# Patient Record
Sex: MALE | Race: WHITE
[De-identification: names, ages, dates, MRNs, and addresses within clinical notes are randomized per-mention and may not be internally consistent; named-entity substitution may affect disease eponyms.]

---

## 2020-06-01 ENCOUNTER — HOSPITAL ENCOUNTER (EMERGENCY)
Dept: HOSPITAL 26 - MED | Age: 35
Discharge: HOME | End: 2020-06-01
Payer: MEDICAID

## 2020-06-01 VITALS — BODY MASS INDEX: 34.1 KG/M2 | HEIGHT: 68 IN | WEIGHT: 225 LBS

## 2020-06-01 VITALS — DIASTOLIC BLOOD PRESSURE: 84 MMHG | SYSTOLIC BLOOD PRESSURE: 128 MMHG

## 2020-06-01 VITALS — SYSTOLIC BLOOD PRESSURE: 164 MMHG | DIASTOLIC BLOOD PRESSURE: 101 MMHG

## 2020-06-01 DIAGNOSIS — I10: ICD-10-CM

## 2020-06-01 DIAGNOSIS — R07.9: Primary | ICD-10-CM

## 2020-06-01 LAB
ALBUMIN FLD-MCNC: 4.1 G/DL (ref 3.4–5)
ANION GAP SERPL CALCULATED.3IONS-SCNC: 13.9 MMOL/L (ref 8–16)
AST SERPL-CCNC: 13 U/L (ref 15–37)
BASOPHILS # BLD AUTO: 0.1 K/UL (ref 0–0.22)
BASOPHILS NFR BLD AUTO: 0.9 % (ref 0–2)
BILIRUB SERPL-MCNC: 0.4 MG/DL (ref 0–1)
BUN SERPL-MCNC: 14 MG/DL (ref 7–18)
CHLORIDE SERPL-SCNC: 102 MMOL/L (ref 98–107)
CHOLEST/HDLC SERPL: 4.6 {RATIO} (ref 1–4.5)
CK MB SERPL-MCNC: 0.6 NG/ML (ref 0–3.6)
CO2 SERPL-SCNC: 27.6 MMOL/L (ref 21–32)
CREAT SERPL-MCNC: 1 MG/DL (ref 0.6–1.3)
EOSINOPHIL # BLD AUTO: 0.1 K/UL (ref 0–0.4)
EOSINOPHIL NFR BLD AUTO: 0.5 % (ref 0–4)
ERYTHROCYTE [DISTWIDTH] IN BLOOD BY AUTOMATED COUNT: 13.1 % (ref 11.6–13.7)
GFR SERPL CREATININE-BSD FRML MDRD: 111 ML/MIN (ref 90–?)
GLUCOSE SERPL-MCNC: 136 MG/DL (ref 74–106)
HCT VFR BLD AUTO: 44.4 % (ref 36–52)
HDLC SERPL-MCNC: 37 MG/DL (ref 40–60)
HGB BLD-MCNC: 15.4 G/DL (ref 12–18)
LDLC SERPL CALC-MCNC: 95 MG/DL (ref 60–100)
LYMPHOCYTES # BLD AUTO: 2.7 K/UL (ref 2–11.5)
LYMPHOCYTES NFR BLD AUTO: 24.2 % (ref 20.5–51.1)
MCH RBC QN AUTO: 30 PG (ref 27–31)
MCHC RBC AUTO-ENTMCNC: 35 G/DL (ref 33–37)
MCV RBC AUTO: 87.3 FL (ref 80–94)
MONOCYTES # BLD AUTO: 1 K/UL (ref 0.8–1)
MONOCYTES NFR BLD AUTO: 8.8 % (ref 1.7–9.3)
NEUTROPHILS # BLD AUTO: 7.4 K/UL (ref 1.8–7.7)
NEUTROPHILS NFR BLD AUTO: 65.6 % (ref 42.2–75.2)
PLATELET # BLD AUTO: 310 K/UL (ref 140–450)
POTASSIUM SERPL-SCNC: 3.5 MMOL/L (ref 3.5–5.1)
RBC # BLD AUTO: 5.08 MIL/UL (ref 4.2–6.1)
SODIUM SERPL-SCNC: 140 MMOL/L (ref 136–145)
TRIGL SERPL-MCNC: 192 MG/DL (ref 30–150)
WBC # BLD AUTO: 11.2 K/UL (ref 4.8–10.8)

## 2020-06-01 PROCEDURE — 80061 LIPID PANEL: CPT

## 2020-06-01 PROCEDURE — 93005 ELECTROCARDIOGRAM TRACING: CPT

## 2020-06-01 PROCEDURE — 85025 COMPLETE CBC W/AUTO DIFF WBC: CPT

## 2020-06-01 PROCEDURE — 71045 X-RAY EXAM CHEST 1 VIEW: CPT

## 2020-06-01 PROCEDURE — 85379 FIBRIN DEGRADATION QUANT: CPT

## 2020-06-01 PROCEDURE — 83880 ASSAY OF NATRIURETIC PEPTIDE: CPT

## 2020-06-01 PROCEDURE — 36415 COLL VENOUS BLD VENIPUNCTURE: CPT

## 2020-06-01 PROCEDURE — 84484 ASSAY OF TROPONIN QUANT: CPT

## 2020-06-01 PROCEDURE — 82553 CREATINE MB FRACTION: CPT

## 2020-06-01 PROCEDURE — 96374 THER/PROPH/DIAG INJ IV PUSH: CPT

## 2020-06-01 PROCEDURE — 82550 ASSAY OF CK (CPK): CPT

## 2020-06-01 PROCEDURE — 96375 TX/PRO/DX INJ NEW DRUG ADDON: CPT

## 2020-06-01 PROCEDURE — 99285 EMERGENCY DEPT VISIT HI MDM: CPT

## 2020-06-01 PROCEDURE — 80053 COMPREHEN METABOLIC PANEL: CPT

## 2020-06-01 NOTE — NUR
33 YO M BIB SELF FOR C/C OF 5/10 SHARP LEFT CHEST PAIN THAT BEGAN 2.5 HOURS AGO 
WHEN PT WAS SITTING DOWN. PAIN IS NONRADIATING AND STAYS ON LEFT SIDE OF CHEST. 
PT STATES HE TOOK 1000 MG OF TYLENOL ABOUT 45 MIN AGO WITH SOME RELIEF OF PAIN. 
S1S2 HEARD. LUNG SOUNDS CLEAR THROUGHOUT. CAP REFILL <3. PERIPHERAL PULSES 
EQUAL AND REGULAR. NO  EDEMA OR JVD PRESENT. PT DENIES N/V. SKIN IS NORMAL FOR 
ETHNICITY. DENIES FEVER, COUGH, SOB, AND TRAVEL. PT PLACED ON CARDIAC MONITOR. 
BED LOCKED AND IN LOWEST POSITION. SIDE RAILS X1. 



NKA

NO MED HX

NO RX

## 2020-06-01 NOTE — NUR
PT STATES HIS PAIN IS NOW 0/10 POST APPLICATION OF TOPICAL NITROGLYCERIN. WILL 
CONTINUE TO MONITOR. SAFETY MEASURES IN PLACE.

## 2020-06-01 NOTE — NUR
REMOVED TOPICAL NITROGLYCERIN OINTMENT PER ERMD REQUEST. PT STATES HIS CHEST 
PAIN IS RELIEVED AT 0/10.

## 2020-06-01 NOTE — NUR
Patient discharged with v/s stable. Written and verbal after care instructions 
given and explained. 

Patient alert, oriented and verbalized understanding of instructions. 
Ambulatory with steady gait. All questions addressed prior to discharge. ID 
band removed. Patient advised to follow up with PMD. Rx of 
LISINOPRIL/HYDROCHLOROTHIAZIDE, NORVASC given. Patient educated on indication 
of medication including possible reaction and side effects. Opportunity to ask 
questions provided and answered.